# Patient Record
Sex: MALE | Race: WHITE | URBAN - METROPOLITAN AREA
[De-identification: names, ages, dates, MRNs, and addresses within clinical notes are randomized per-mention and may not be internally consistent; named-entity substitution may affect disease eponyms.]

---

## 2017-07-25 ENCOUNTER — OFFICE VISIT (OUTPATIENT)
Dept: PODIATRY | Facility: OTHER | Age: 67
End: 2017-07-25
Payer: COMMERCIAL

## 2017-07-25 VITALS — HEIGHT: 66 IN | TEMPERATURE: 98.1 F | BODY MASS INDEX: 26.84 KG/M2 | WEIGHT: 167 LBS

## 2017-07-25 DIAGNOSIS — L60.0 INGROWING NAIL: ICD-10-CM

## 2017-07-25 DIAGNOSIS — L60.3 ONYCHODYSTROPHY: Primary | ICD-10-CM

## 2017-07-25 PROCEDURE — 99203 OFFICE O/P NEW LOW 30 MIN: CPT | Performed by: PODIATRIST

## 2017-07-25 ASSESSMENT — PAIN SCALES - GENERAL: PAINLEVEL: NO PAIN (0)

## 2017-07-25 NOTE — NURSING NOTE
"Chief Complaint   Patient presents with     Consult     toenail fungus, ongoing for years; new pt       Initial Temp 98.1  F (36.7  C) (Temporal)  Ht 5' 6\" (1.676 m)  Wt 167 lb (75.8 kg)  BMI 26.95 kg/m2 Estimated body mass index is 26.95 kg/(m^2) as calculated from the following:    Height as of this encounter: 5' 6\" (1.676 m).    Weight as of this encounter: 167 lb (75.8 kg).  BP completed using cuff size: NA (Not Taken)  Medication Reconciliation: complete    Ivanna wKok CMA, July 25, 2017    "

## 2017-07-25 NOTE — PATIENT INSTRUCTIONS
"Nail Debridement    A high quality instrument makes trimming toenails MUCH easier.  Search [employer] for any 5\" nail nipper manufactured by reliable brands such as Miltex, Integra or Jarit as these quality instruments will help manage difficult nails more effectively and comfortably. Search \"Miltex -CH\" for chrome nippers about $67 or \"Miltex -SS\" for stainless steel and are my preferred instrument in the clinic as they are processed through the autoclave after each use.  A physician is not necessary to trim nails even if you are taking blood thinners or are diabetic.  Your family or care givers may help manage your toenails.      Trim or sand the nails once weekly.  Do not wait until they are long and painful or trimming will become too difficult and painful and will increase your risk of complications or infection.  A course file or 120 grit sandpaper on a block can be helpful.  For very thick nails many people prefer battery operated calix such as an Amope', Personal Pedi and Emjoi for regular use or heavy painful callouses or thick toenails.    Trim or skive any portion of nail that is thick, loose, crumbling, or not well attached. Do not tear the nail away, but rather cut them with a nail nipper.  You may follow up with your Podiatric Physician if you have pain, bleeding, infection, questions or other concerns.      You may also contact the following Registered Nurses for further help with nail debridement and minor hygiene concnerns.  They will come to your home, trim your toenails and soak your feet, as well as monitor for any complications that would require evaluation by a Physician.      [employer] [facility]  Blanca Sweeney RN, [facility]  [phone]  [email or web address]    [facility].  Jessiac Nino RN  Office [phone]  [email or web address]    [facility] [employer] by Marguerite Berkowitz RN  [phone]  Call or text for appointment      "

## 2017-07-25 NOTE — PROGRESS NOTES
HPI:  Irving Davila is a 67 year old male who is seen in consultation at the request of self.    Pt presents for eval of:   (Onset, Location, L/R, Character, Treatments, Injury if yes)     Ongoing for years, thick, discolored Left and Right toenails    Clear toenail polish, OTC antifungal creams, soaking    Retired.    BMI is normal.    ROS:  10 point ROS neg other than the symptoms noted above in the HPI.    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        PAST SURGICAL HISTORY:   Past Surgical History:   Procedure Laterality Date     HC COLONOSCOPY THRU STOMA, DIAGNOSTIC  May 2009    one polyp- FU in 5 years     HC MOHS HEAD/NCK/HND/FT/GEN 1ST STAGE UP T0 5 BLOCKS  2001    nose        MEDICATIONS:   Current Outpatient Prescriptions:      LISINOPRIL PO, Take 5 mg by mouth daily, Disp: , Rfl:      doxycycline (PERIOSTAT) 20 MG tablet, Take 20 mg by mouth 2 times daily, Disp: , Rfl:      SIMVASTATIN PO, , Disp: , Rfl:      TAMSULOSIN HCL PO, , Disp: , Rfl:      Omeprazole (PRILOSEC PO), , Disp: , Rfl:      guaiFENesin (MUCINEX) 600 MG 12 hr tablet, Take 1,200 mg by mouth 2 times daily, Disp: , Rfl:      VIAGRA 100 MG OR TABS, ONE TABLET TAKEN AT LEAST 30 MINUTES BEFORE INTERCOURSE, Disp: 6, Rfl: prn     ALLERGIES:    Allergies   Allergen Reactions     Codeine         SOCIAL HISTORY:   Social History     Social History     Marital status:      Spouse name: N/A     Number of children: N/A     Years of education: N/A     Occupational History     Not on file.     Social History Main Topics     Smoking status: Former Smoker     Types: Cigarettes     Quit date: 7/25/2015     Smokeless tobacco: Never Used      Comment: 4 cigarettes a day (4/15/2009)- since 1979 or so     Alcohol use Yes      Comment: occasionally     Drug use: No     Sexual activity: Not on file     Other Topics Concern      Service Yes      "Blood Transfusions No     Caffeine Concern No     Sleep Concern No     Stress Concern No     Weight Concern No     Special Diet No     Exercise Yes     3 days/ week     Bike Helmet No     Seat Belt Yes     Social History Narrative        FAMILY HISTORY:   Family History   Problem Relation Age of Onset     HEART DISEASE Father      rapid heart rate- 1979     CANCER Sister      Brain tumor-age 67 ()     Alcohol/Drug Mother      cirrhosis from alcoholism        EXAM:Vitals: Temp 98.1  F (36.7  C) (Temporal)  Ht 5' 6\" (1.676 m)  Wt 167 lb (75.8 kg)  BMI 26.95 kg/m2  BMI= Body mass index is 26.95 kg/(m^2).    General appearance: Patient is alert and fully cooperative with history & exam.  No sign of distress is noted during the visit.     Psychiatric: Affect is pleasant & appropriate.  Patient appears motivated to improve health.     Respiratory: Breathing is regular & unlabored while sitting.     HEENT: Hearing is intact to spoken word.  Speech is clear.  No gross evidence of visual impairment that would impact ambulation.     Vascular: DP & PT pulses are intact & regular bilaterally.  No significant edema or varicosities noted.  CFT and skin temperature is normal to both lower extremities.     Neurologic: Lower extremity sensation is intact to light touch.  No evidence of weakness or contracture in the lower extremities.  No evidence of neuropathy.    Dermatologic: Skin is intact to both lower extremities with adequate texture, turgor and tone about the integument.  Most the toenails are quite dystrophic thickened discolored elongated and lysing.    Musculoskeletal: Patient is ambulatory without assistive device or brace.  No gross ankle deformity noted.  No foot or ankle joint effusion is noted.       ASSESSMENT:       ICD-10-CM    1. Onychodystrophy L60.3    2. Ingrowing nail L60.0         PLAN:  Reviewed patient's chart in [facility].      2017   We discussed the nature of onychodystrophy and " scarring of the toenails and secondary infection of fungus. I offered to utilize an oral antifungal with him but would not expect this to resolve his concerns as these nails have become this way secondary to scarring. We also discussed at-home debridement with a brace debridement and medical grade nail nippers and written instructions were dispensed. We also discussed permanently removing one or multiple affected toenails or most symptomatic nails. All questions were answered throughout the 30 minute visit.    Follow-up as needed.      Layo Foy DPM

## 2017-07-25 NOTE — MR AVS SNAPSHOT
"              After Visit Summary   7/25/2017    Irving Davila    MRN: [id number]           Patient Information     Date Of Birth          1950        Visit Information        Provider Department      7/25/2017 8:30 AM Layo Foy DPM [facility] Instructions    Nail Debridement    A high quality instrument makes trimming toenails MUCH easier.  Search ebay for any 5\" nail nipper manufactured by reliable brands such as Miltex, Integra or Jarit as these quality instruments will help manage difficult nails more effectively and comfortably. Search \"Miltex -CH\" for chrome nippers about $67 or \"Miltex -SS\" for stainless steel and are my preferred instrument in the clinic as they are processed through the autoclave after each use.  A physician is not necessary to trim nails even if you are taking blood thinners or are diabetic.  Your family or care givers may help manage your toenails.      Trim or sand the nails once weekly.  Do not wait until they are long and painful or trimming will become too difficult and painful and will increase your risk of complications or infection.  A course file or 120 grit sandpaper on a block can be helpful.  For very thick nails many people prefer battery operated calix such as an Amope', Personal Pedi and Emjoi for regular use or heavy painful callouses or thick toenails.    Trim or skive any portion of nail that is thick, loose, crumbling, or not well attached. Do not tear the nail away, but rather cut them with a nail nipper.  You may follow up with your Podiatric Physician if you have pain, bleeding, infection, questions or other concerns.      You may also contact the following Registered Nurses for further help with nail debridement and minor hygiene concnerns.  They will come to your home, trim your toenails and soak your feet, as well as monitor for any complications that would require evaluation by a Physician.      [employer] " "[employer]  Blanca Sweeney RN, [facility]  [phone]  [email or web address]    [facility]  Jessica Nino RN  Office [phone]  [email or web address]    [facility] by Marguerite Berkowitz RN  [phone]  Call or text for appointment              Follow-ups after your visit        Who to contact     If you have questions or need follow up information about today's clinic visit or your schedule please contact [facility] directly at [phone].  Normal or non-critical lab and imaging results will be communicated to you by RoomiePicshart, letter or phone within 4 business days after the clinic has received the results. If you do not hear from us within 7 days, please contact the clinic through MyChart or phone. If you have a critical or abnormal lab result, we will notify you by phone as soon as possible.  Submit refill requests through [employer] or call your pharmacy and they will forward the refill request to us. Please allow 3 business days for your refill to be completed.          Additional Information About Your Visit        RoomiePicshart Information     Plink Search lets you send messages to your doctor, view your test results, renew your prescriptions, schedule appointments and more. To sign up, go to [email or web address] . Click on \"Log in\" on the left side of the screen, which will take you to the Welcome page. Then click on \"Sign up Now\" on the right side of the page.     You will be asked to enter the access code listed below, as well as some personal information. Please follow the directions to create your username and password.     Your access code is: [email or web address]  Expires: 10/23/2017  9:00 AM     Your access code will  in 90 days. If you need help or a new code, please call your [facility] or [phone].        Care EveryWhere ID     This is your Care EveryWhere ID. This could be used by other organizations to access your [facility] medical records  [id number]      " "  Your Vitals Were     Temperature Height BMI (Body Mass Index)             98.1  F (36.7  C) (Temporal) 5' 6\" (1.676 m) 26.95 kg/m2          Blood Pressure from Last 3 Encounters:   02/05/16 150/87   08/19/15 157/75   04/15/09 128/72    Weight from Last 3 Encounters:   07/25/17 167 lb (75.8 kg)   04/15/09 167 lb 3.2 oz (75.8 kg)              Today, you had the following     No orders found for display       Primary Care Provider    None       No address on file        Equal Access to Services     [facility]: Hadii pratik Bae, wapadmini collado, urvashi kaalmabela weiss, erinn zepeda . So [facility] [phone].    ATENCIÓN: Si habla español, tiene a triplett disposición servicios gratuitos de asistencia lingüística. Llame al [phone].    We comply with applicable federal civil rights laws and [place] laws. We do not discriminate on the basis of race, color, national origin, age, disability sex, sexual orientation or gender identity.            Thank you!     Thank you for choosing [facility]  for your care. Our goal is always to provide you with excellent care. Hearing back from our patients is one way we can continue to improve our services. Please take a few minutes to complete the written survey that you may receive in the mail after your visit with us. Thank you!             Your Updated Medication List - Protect others around you: Learn how to safely use, store and throw away your medicines at www.disposemymeds.org.          This list is accurate as of: 7/25/17  9:01 AM.  Always use your most recent med list.                   Brand Name Dispense Instructions for use Diagnosis    doxycycline 20 MG tablet    PERIOSTAT     Take 20 mg by mouth 2 times daily        guaiFENesin 600 MG 12 hr tablet    MUCINEX     Take 1,200 mg by mouth 2 times daily        LISINOPRIL PO      Take 5 mg by mouth daily        PRILOSEC PO           SIMVASTATIN PO           TAMSULOSIN " HCL PO           VIAGRA 100 MG cap/tab   Generic drug:  sildenafil     6    ONE TABLET TAKEN AT LEAST 30 MINUTES BEFORE INTERCOURSE    Erectile dysfunction